# Patient Record
Sex: FEMALE | Employment: FULL TIME | URBAN - METROPOLITAN AREA
[De-identification: names, ages, dates, MRNs, and addresses within clinical notes are randomized per-mention and may not be internally consistent; named-entity substitution may affect disease eponyms.]

---

## 2017-04-01 ENCOUNTER — APPOINTMENT (OUTPATIENT)
Dept: GENERAL RADIOLOGY | Facility: HOSPITAL | Age: 62
End: 2017-04-01
Attending: EMERGENCY MEDICINE
Payer: COMMERCIAL

## 2017-04-01 ENCOUNTER — HOSPITAL ENCOUNTER (EMERGENCY)
Facility: HOSPITAL | Age: 62
Discharge: HOME | End: 2017-04-01
Attending: EMERGENCY MEDICINE
Payer: COMMERCIAL

## 2017-04-01 VITALS
BODY MASS INDEX: 40.97 KG/M2 | OXYGEN SATURATION: 97 % | TEMPERATURE: 98 F | DIASTOLIC BLOOD PRESSURE: 79 MMHG | WEIGHT: 240 LBS | RESPIRATION RATE: 16 BRPM | SYSTOLIC BLOOD PRESSURE: 175 MMHG | HEART RATE: 59 BPM | HEIGHT: 64 IN

## 2017-04-01 DIAGNOSIS — M17.11 PRIMARY OSTEOARTHRITIS OF RIGHT KNEE: Primary | ICD-10-CM

## 2017-04-01 PROCEDURE — 99284 EMERGENCY DEPT VISIT MOD MDM: CPT

## 2017-04-01 PROCEDURE — 72110 X-RAY EXAM L-2 SPINE 4/>VWS: CPT

## 2017-04-01 PROCEDURE — 73552 X-RAY EXAM OF FEMUR 2/>: CPT

## 2017-04-01 RX ORDER — HYDROCODONE BITARTRATE AND ACETAMINOPHEN 5; 325 MG/1; MG/1
1-2 TABLET ORAL EVERY 4 HOURS PRN
Qty: 20 TABLET | Refills: 0 | Status: SHIPPED | OUTPATIENT
Start: 2017-04-01 | End: 2017-04-08

## 2017-04-01 RX ORDER — HYDROCODONE BITARTRATE AND ACETAMINOPHEN 5; 325 MG/1; MG/1
1 TABLET ORAL ONCE
Status: COMPLETED | OUTPATIENT
Start: 2017-04-01 | End: 2017-04-01

## 2017-04-01 NOTE — ED PROVIDER NOTES
Patient Seen in: [facility] Emergency Department    History   Patient presents with:  Lower Extremity Injury (musculoskeletal)  Pain (neurologic)    Stated Complaint: groin/leg/hip pain    HPI    Patient presents with left lower extremity pain which [address]    UMBILICAL HERNIA REPAIR N/A 5/22/2015    Comment Procedure:  HERNIA UMBILICAL REPAIR ADULT;  Surgeon: Brooke Grewal MD;  Location:  MAIN OR       Medications :   HYDROcodone-acetaminophen 5-325 MG Oral Tab,  Take 1-2 tablets by m the knee and the hip seems to elicit discomfort. No crepitation, edema or effusion is noted. Distal CMS exam is intact. Mild discomfort is noted with palpation in the lumbosacral region as well.   Distal deep tendon reflexes, motor and sensory exam is un

## 2017-04-01 NOTE — ED INITIAL ASSESSMENT (HPI)
C/o pain to entire right leg intermittently for a couple week,  Worse today since am.  Able to ambulate  No injury

## 2017-04-05 PROBLEM — M54.42 ACUTE LEFT-SIDED LOW BACK PAIN WITH LEFT-SIDED SCIATICA: Status: ACTIVE | Noted: 2017-04-05

## 2017-04-05 PROBLEM — M17.0 PRIMARY OSTEOARTHRITIS OF BOTH KNEES: Status: ACTIVE | Noted: 2017-04-05

## 2017-05-10 PROBLEM — M54.42 ACUTE LEFT-SIDED LOW BACK PAIN WITH LEFT-SIDED SCIATICA: Status: ACTIVE | Noted: 2017-05-10

## 2017-05-22 ENCOUNTER — APPOINTMENT (OUTPATIENT)
Dept: LAB | Age: 62
End: 2017-05-22
Attending: INTERNAL MEDICINE
Payer: COMMERCIAL

## 2017-05-22 DIAGNOSIS — E04.2 MULTIPLE THYROID NODULES: ICD-10-CM

## 2017-05-23 PROCEDURE — 88173 CYTOPATH EVAL FNA REPORT: CPT

## 2017-05-31 NOTE — PROGRESS NOTES
Quick Note:    I would let her know that I reviewed her FNA report. One nodule came back nondiagnostic, the other was benign.  Given the overall stability of her nodules and the fact that she has had more than 1 previous benign FNA, I would recommend we con

## 2017-05-31 NOTE — PROGRESS NOTES
Quick Note:    Primary patient preferred number [phone] [facility] Ina Blank result note.  Hours and number given.     ______

## 2017-06-01 NOTE — PROGRESS NOTES
Quick Note:    Patient informed of Dr. Amrik Souza note. Patient verbalized understanding and agrees. Thyroid ultrasound ordered, and follow-up appt scheduled.      6/4/2018 8:00 AM Chicho Wilson MD Arbie Beer    ______

## 2017-07-28 PROCEDURE — 87186 SC STD MICRODIL/AGAR DIL: CPT | Performed by: OBSTETRICS & GYNECOLOGY

## 2017-07-28 PROCEDURE — 87070 CULTURE OTHR SPECIMN AEROBIC: CPT | Performed by: OBSTETRICS & GYNECOLOGY

## 2017-07-28 PROCEDURE — 88175 CYTOPATH C/V AUTO FLUID REDO: CPT | Performed by: OBSTETRICS & GYNECOLOGY

## 2017-07-28 PROCEDURE — 87077 CULTURE AEROBIC IDENTIFY: CPT | Performed by: OBSTETRICS & GYNECOLOGY

## 2017-07-28 PROCEDURE — 87205 SMEAR GRAM STAIN: CPT | Performed by: OBSTETRICS & GYNECOLOGY

## 2018-05-31 PROBLEM — H25.13 NUCLEAR SCLEROTIC CATARACT OF BOTH EYES: Status: ACTIVE | Noted: 2018-05-31

## 2018-05-31 PROBLEM — E11.9 DIABETES MELLITUS TYPE 2 WITHOUT RETINOPATHY (HCC): Status: ACTIVE | Noted: 2018-05-31

## 2018-08-07 PROCEDURE — 88175 CYTOPATH C/V AUTO FLUID REDO: CPT | Performed by: OBSTETRICS & GYNECOLOGY

## 2018-10-18 PROCEDURE — 88305 TISSUE EXAM BY PATHOLOGIST: CPT | Performed by: INTERNAL MEDICINE

## 2019-01-08 PROBLEM — M54.41 ACUTE RIGHT-SIDED LOW BACK PAIN WITH RIGHT-SIDED SCIATICA: Status: ACTIVE | Noted: 2019-01-08

## 2019-08-16 PROCEDURE — 81001 URINALYSIS AUTO W/SCOPE: CPT | Performed by: INTERNAL MEDICINE

## 2019-08-23 PROCEDURE — 88175 CYTOPATH C/V AUTO FLUID REDO: CPT | Performed by: OBSTETRICS & GYNECOLOGY

## 2019-08-27 PROCEDURE — 81015 MICROSCOPIC EXAM OF URINE: CPT | Performed by: OBSTETRICS & GYNECOLOGY

## 2020-08-17 PROBLEM — M54.41 ACUTE RIGHT-SIDED LOW BACK PAIN WITH RIGHT-SIDED SCIATICA: Status: RESOLVED | Noted: 2019-01-08 | Resolved: 2020-08-17

## 2020-08-17 PROBLEM — E66.9 OBESITY: Status: ACTIVE | Noted: 2020-08-17

## 2021-03-29 ENCOUNTER — ANESTHESIA EVENT (OUTPATIENT)
Dept: SURGERY | Facility: HOSPITAL | Age: 66
DRG: 908 | End: 2021-03-29
Payer: COMMERCIAL

## 2021-03-29 ENCOUNTER — ANESTHESIA (OUTPATIENT)
Dept: SURGERY | Facility: HOSPITAL | Age: 66
DRG: 908 | End: 2021-03-29
Payer: COMMERCIAL

## 2021-03-29 ENCOUNTER — HOSPITAL ENCOUNTER (INPATIENT)
Facility: HOSPITAL | Age: 66
LOS: 3 days | Discharge: HOME | DRG: 908 | End: 2021-04-01
Attending: SURGERY | Admitting: SURGERY
Payer: COMMERCIAL

## 2021-03-29 DIAGNOSIS — S31.109A CHRONIC ABDOMINAL WOUND INFECTION: Primary | ICD-10-CM

## 2021-03-29 DIAGNOSIS — L08.9 CHRONIC WOUND INFECTION OF ABDOMEN, SUBSEQUENT ENCOUNTER: ICD-10-CM

## 2021-03-29 DIAGNOSIS — S31.109D CHRONIC WOUND INFECTION OF ABDOMEN, SUBSEQUENT ENCOUNTER: ICD-10-CM

## 2021-03-29 DIAGNOSIS — L08.9 CHRONIC ABDOMINAL WOUND INFECTION: Primary | ICD-10-CM

## 2021-03-29 PROCEDURE — 88307 TISSUE EXAM BY PATHOLOGIST: CPT | Performed by: SURGERY

## 2021-03-29 PROCEDURE — 82962 GLUCOSE BLOOD TEST: CPT

## 2021-03-29 PROCEDURE — 0WPF4JZ REMOVAL OF SYNTHETIC SUBSTITUTE FROM ABDOMINAL WALL, PERCUTANEOUS ENDOSCOPIC APPROACH: ICD-10-PCS | Performed by: SURGERY

## 2021-03-29 PROCEDURE — 88304 TISSUE EXAM BY PATHOLOGIST: CPT | Performed by: SURGERY

## 2021-03-29 PROCEDURE — 0DB84ZZ EXCISION OF SMALL INTESTINE, PERCUTANEOUS ENDOSCOPIC APPROACH: ICD-10-PCS | Performed by: SURGERY

## 2021-03-29 RX ORDER — DEXAMETHASONE SODIUM PHOSPHATE 4 MG/ML
VIAL (ML) INJECTION AS NEEDED
Status: DISCONTINUED | OUTPATIENT
Start: 2021-03-29 | End: 2021-03-29 | Stop reason: SURG

## 2021-03-29 RX ORDER — SODIUM CHLORIDE, SODIUM LACTATE, POTASSIUM CHLORIDE, CALCIUM CHLORIDE 600; 310; 30; 20 MG/100ML; MG/100ML; MG/100ML; MG/100ML
INJECTION, SOLUTION INTRAVENOUS CONTINUOUS
Status: DISCONTINUED | OUTPATIENT
Start: 2021-03-29 | End: 2021-04-01

## 2021-03-29 RX ORDER — HYDROMORPHONE HYDROCHLORIDE 1 MG/ML
0.8 INJECTION, SOLUTION INTRAMUSCULAR; INTRAVENOUS; SUBCUTANEOUS EVERY 2 HOUR PRN
Status: DISCONTINUED | OUTPATIENT
Start: 2021-03-29 | End: 2021-04-01

## 2021-03-29 RX ORDER — HYDROMORPHONE HYDROCHLORIDE 1 MG/ML
INJECTION, SOLUTION INTRAMUSCULAR; INTRAVENOUS; SUBCUTANEOUS
Status: COMPLETED
Start: 2021-03-29 | End: 2021-03-29

## 2021-03-29 RX ORDER — HEPARIN SODIUM 5000 [USP'U]/ML
5000 INJECTION, SOLUTION INTRAVENOUS; SUBCUTANEOUS ONCE
Status: COMPLETED | OUTPATIENT
Start: 2021-03-29 | End: 2021-03-29

## 2021-03-29 RX ORDER — NALOXONE HYDROCHLORIDE 0.4 MG/ML
80 INJECTION, SOLUTION INTRAMUSCULAR; INTRAVENOUS; SUBCUTANEOUS AS NEEDED
Status: DISCONTINUED | OUTPATIENT
Start: 2021-03-29 | End: 2021-03-29 | Stop reason: HOSPADM

## 2021-03-29 RX ORDER — SODIUM CHLORIDE AND POTASSIUM CHLORIDE .9; .15 G/100ML; G/100ML
SOLUTION INTRAVENOUS CONTINUOUS
Status: DISCONTINUED | OUTPATIENT
Start: 2021-03-29 | End: 2021-04-01

## 2021-03-29 RX ORDER — ONDANSETRON 2 MG/ML
INJECTION INTRAMUSCULAR; INTRAVENOUS AS NEEDED
Status: DISCONTINUED | OUTPATIENT
Start: 2021-03-29 | End: 2021-03-29 | Stop reason: SURG

## 2021-03-29 RX ORDER — ACETAMINOPHEN 500 MG
500 TABLET ORAL EVERY 6 HOURS PRN
COMMUNITY

## 2021-03-29 RX ORDER — HYDROCODONE BITARTRATE AND ACETAMINOPHEN 5; 325 MG/1; MG/1
1 TABLET ORAL AS NEEDED
Status: DISCONTINUED | OUTPATIENT
Start: 2021-03-29 | End: 2021-03-29 | Stop reason: HOSPADM

## 2021-03-29 RX ORDER — SODIUM CHLORIDE, SODIUM LACTATE, POTASSIUM CHLORIDE, CALCIUM CHLORIDE 600; 310; 30; 20 MG/100ML; MG/100ML; MG/100ML; MG/100ML
INJECTION, SOLUTION INTRAVENOUS CONTINUOUS
Status: DISCONTINUED | OUTPATIENT
Start: 2021-03-29 | End: 2021-03-29 | Stop reason: HOSPADM

## 2021-03-29 RX ORDER — ONDANSETRON 2 MG/ML
4 INJECTION INTRAMUSCULAR; INTRAVENOUS AS NEEDED
Status: DISCONTINUED | OUTPATIENT
Start: 2021-03-29 | End: 2021-03-29 | Stop reason: HOSPADM

## 2021-03-29 RX ORDER — ROCURONIUM BROMIDE 10 MG/ML
INJECTION, SOLUTION INTRAVENOUS AS NEEDED
Status: DISCONTINUED | OUTPATIENT
Start: 2021-03-29 | End: 2021-03-29 | Stop reason: SURG

## 2021-03-29 RX ORDER — KETOROLAC TROMETHAMINE 15 MG/ML
15 INJECTION, SOLUTION INTRAMUSCULAR; INTRAVENOUS EVERY 6 HOURS PRN
Status: ACTIVE | OUTPATIENT
Start: 2021-03-29 | End: 2021-03-31

## 2021-03-29 RX ORDER — HYDROMORPHONE HYDROCHLORIDE 1 MG/ML
0.4 INJECTION, SOLUTION INTRAMUSCULAR; INTRAVENOUS; SUBCUTANEOUS EVERY 2 HOUR PRN
Status: DISCONTINUED | OUTPATIENT
Start: 2021-03-29 | End: 2021-04-01

## 2021-03-29 RX ORDER — BUPIVACAINE HYDROCHLORIDE AND EPINEPHRINE 5; 5 MG/ML; UG/ML
INJECTION, SOLUTION EPIDURAL; INTRACAUDAL; PERINEURAL AS NEEDED
Status: DISCONTINUED | OUTPATIENT
Start: 2021-03-29 | End: 2021-03-29 | Stop reason: HOSPADM

## 2021-03-29 RX ORDER — PHENYLEPHRINE HCL 10 MG/ML
VIAL (ML) INJECTION AS NEEDED
Status: DISCONTINUED | OUTPATIENT
Start: 2021-03-29 | End: 2021-03-29 | Stop reason: SURG

## 2021-03-29 RX ORDER — NEOSTIGMINE METHYLSULFATE 1 MG/ML
INJECTION INTRAVENOUS AS NEEDED
Status: DISCONTINUED | OUTPATIENT
Start: 2021-03-29 | End: 2021-03-29 | Stop reason: SURG

## 2021-03-29 RX ORDER — HEPARIN SODIUM 5000 [USP'U]/ML
5000 INJECTION, SOLUTION INTRAVENOUS; SUBCUTANEOUS EVERY 8 HOURS SCHEDULED
Status: DISCONTINUED | OUTPATIENT
Start: 2021-03-29 | End: 2021-04-01

## 2021-03-29 RX ORDER — HYDROCODONE BITARTRATE AND ACETAMINOPHEN 5; 325 MG/1; MG/1
2 TABLET ORAL EVERY 4 HOURS PRN
Status: DISCONTINUED | OUTPATIENT
Start: 2021-03-29 | End: 2021-04-01

## 2021-03-29 RX ORDER — HYDROCODONE BITARTRATE AND ACETAMINOPHEN 5; 325 MG/1; MG/1
1 TABLET ORAL EVERY 4 HOURS PRN
Status: DISCONTINUED | OUTPATIENT
Start: 2021-03-29 | End: 2021-04-01

## 2021-03-29 RX ORDER — LIDOCAINE HYDROCHLORIDE 10 MG/ML
INJECTION, SOLUTION EPIDURAL; INFILTRATION; INTRACAUDAL; PERINEURAL AS NEEDED
Status: DISCONTINUED | OUTPATIENT
Start: 2021-03-29 | End: 2021-03-29 | Stop reason: SURG

## 2021-03-29 RX ORDER — KETOROLAC TROMETHAMINE 30 MG/ML
30 INJECTION, SOLUTION INTRAMUSCULAR; INTRAVENOUS EVERY 6 HOURS PRN
Status: DISPENSED | OUTPATIENT
Start: 2021-03-29 | End: 2021-03-31

## 2021-03-29 RX ORDER — ACETAMINOPHEN 500 MG
1000 TABLET ORAL ONCE
Status: DISCONTINUED | OUTPATIENT
Start: 2021-03-29 | End: 2021-03-29

## 2021-03-29 RX ORDER — HYDROMORPHONE HYDROCHLORIDE 1 MG/ML
0.4 INJECTION, SOLUTION INTRAMUSCULAR; INTRAVENOUS; SUBCUTANEOUS EVERY 5 MIN PRN
Status: DISCONTINUED | OUTPATIENT
Start: 2021-03-29 | End: 2021-03-29 | Stop reason: HOSPADM

## 2021-03-29 RX ORDER — DEXTROSE MONOHYDRATE 25 G/50ML
50 INJECTION, SOLUTION INTRAVENOUS
Status: DISCONTINUED | OUTPATIENT
Start: 2021-03-29 | End: 2021-04-01

## 2021-03-29 RX ORDER — GLYCOPYRROLATE 0.2 MG/ML
INJECTION, SOLUTION INTRAMUSCULAR; INTRAVENOUS AS NEEDED
Status: DISCONTINUED | OUTPATIENT
Start: 2021-03-29 | End: 2021-03-29 | Stop reason: SURG

## 2021-03-29 RX ORDER — HYDROCODONE BITARTRATE AND ACETAMINOPHEN 5; 325 MG/1; MG/1
2 TABLET ORAL AS NEEDED
Status: DISCONTINUED | OUTPATIENT
Start: 2021-03-29 | End: 2021-03-29 | Stop reason: HOSPADM

## 2021-03-29 RX ORDER — DEXTROSE MONOHYDRATE 25 G/50ML
50 INJECTION, SOLUTION INTRAVENOUS
Status: DISCONTINUED | OUTPATIENT
Start: 2021-03-29 | End: 2021-03-29 | Stop reason: HOSPADM

## 2021-03-29 RX ORDER — LEVOFLOXACIN 5 MG/ML
750 INJECTION, SOLUTION INTRAVENOUS EVERY 24 HOURS
Status: DISCONTINUED | OUTPATIENT
Start: 2021-03-29 | End: 2021-04-01

## 2021-03-29 RX ORDER — HYDROMORPHONE HYDROCHLORIDE 1 MG/ML
1.2 INJECTION, SOLUTION INTRAMUSCULAR; INTRAVENOUS; SUBCUTANEOUS EVERY 2 HOUR PRN
Status: DISCONTINUED | OUTPATIENT
Start: 2021-03-29 | End: 2021-04-01

## 2021-03-29 RX ORDER — EPHEDRINE SULFATE 50 MG/ML
INJECTION INTRAVENOUS AS NEEDED
Status: DISCONTINUED | OUTPATIENT
Start: 2021-03-29 | End: 2021-03-29 | Stop reason: SURG

## 2021-03-29 RX ORDER — CLINDAMYCIN PHOSPHATE 900 MG/50ML
900 INJECTION INTRAVENOUS ONCE
Status: COMPLETED | OUTPATIENT
Start: 2021-03-29 | End: 2021-03-29

## 2021-03-29 RX ADMIN — EPHEDRINE SULFATE 10 MG: 50 INJECTION INTRAVENOUS at 14:44:00

## 2021-03-29 RX ADMIN — PHENYLEPHRINE HCL 100 MCG: 10 MG/ML VIAL (ML) INJECTION at 13:13:00

## 2021-03-29 RX ADMIN — LIDOCAINE HYDROCHLORIDE 50 MG: 10 INJECTION, SOLUTION EPIDURAL; INFILTRATION; INTRACAUDAL; PERINEURAL at 12:52:00

## 2021-03-29 RX ADMIN — SODIUM CHLORIDE, SODIUM LACTATE, POTASSIUM CHLORIDE, CALCIUM CHLORIDE: 600; 310; 30; 20 INJECTION, SOLUTION INTRAVENOUS at 15:12:00

## 2021-03-29 RX ADMIN — GLYCOPYRROLATE 1 MG: 0.2 INJECTION, SOLUTION INTRAMUSCULAR; INTRAVENOUS at 14:58:00

## 2021-03-29 RX ADMIN — ROCURONIUM BROMIDE 10 MG: 10 INJECTION, SOLUTION INTRAVENOUS at 14:30:00

## 2021-03-29 RX ADMIN — ROCURONIUM BROMIDE 20 MG: 10 INJECTION, SOLUTION INTRAVENOUS at 13:42:00

## 2021-03-29 RX ADMIN — DEXAMETHASONE SODIUM PHOSPHATE 4 MG: 4 MG/ML VIAL (ML) INJECTION at 13:09:00

## 2021-03-29 RX ADMIN — EPHEDRINE SULFATE 10 MG: 50 INJECTION INTRAVENOUS at 13:30:00

## 2021-03-29 RX ADMIN — ROCURONIUM BROMIDE 50 MG: 10 INJECTION, SOLUTION INTRAVENOUS at 12:52:00

## 2021-03-29 RX ADMIN — EPHEDRINE SULFATE 10 MG: 50 INJECTION INTRAVENOUS at 14:49:00

## 2021-03-29 RX ADMIN — SODIUM CHLORIDE, SODIUM LACTATE, POTASSIUM CHLORIDE, CALCIUM CHLORIDE: 600; 310; 30; 20 INJECTION, SOLUTION INTRAVENOUS at 13:53:00

## 2021-03-29 RX ADMIN — ONDANSETRON 4 MG: 2 INJECTION INTRAMUSCULAR; INTRAVENOUS at 14:58:00

## 2021-03-29 RX ADMIN — EPHEDRINE SULFATE 10 MG: 50 INJECTION INTRAVENOUS at 14:13:00

## 2021-03-29 RX ADMIN — NEOSTIGMINE METHYLSULFATE 5 MG: 1 INJECTION INTRAVENOUS at 14:58:00

## 2021-03-29 RX ADMIN — CLINDAMYCIN PHOSPHATE 900 MG: 900 INJECTION INTRAVENOUS at 13:05:00

## 2021-03-29 RX ADMIN — EPHEDRINE SULFATE 10 MG: 50 INJECTION INTRAVENOUS at 13:44:00

## 2021-03-29 RX ADMIN — PHENYLEPHRINE HCL 100 MCG: 10 MG/ML VIAL (ML) INJECTION at 13:19:00

## 2021-03-29 NOTE — ANESTHESIA POSTPROCEDURE EVALUATION
[address] [facility] Patient Status:  Hospital Outpatient Surgery   Age/Gender 63year old female MRN [id number]   [facility] SURGERY Attending Taran Phillips MD   Hosp Day # 0 PCP Marcelino Harrell MD       Anesthesia Post-op

## 2021-03-29 NOTE — H&P
Maren Rouse is a 63year old female  No chief complaint on file. 64 yo F with drainage from umbilicus and CT scan with inflammation under umbilicus.           Past Medical History:   Diagnosis Date   • ALLERGIC RHINITIS 1/6/2011   • Anxiety 8/11/200 for Pain., Disp: , Rfl:   Nebivolol HCl (BYSTOLIC) 10 MG Oral Tab, Take 1 tablet (10 mg total) by mouth daily. , Disp: 90 tablet, Rfl: 1  amLODIPine Besylate 10 MG Oral Tab, Take 1 tablet (10 mg total) by mouth daily. , Disp: 90 tablet, Rfl: 1  Irbesartan 15 constipation, diarrhea; no rectal bleeding; no heartburn  GENITAL/: no dysuria, urgency or frequency, no tea colored urine  MUSCULOSKELETAL: no joint complaints upper or lower extremities  HEMATOLOGY: denies hx anemia; denies bruising or excessive bleedi

## 2021-03-29 NOTE — ANESTHESIA PREPROCEDURE EVALUATION
PRE-OP EVALUATION    Patient Name: Talya Almonte    Admit Diagnosis: Chronic wound infection of abdomen, subsequent encounter [S31.109D, L08.9]    Pre-op Diagnosis: Chronic wound infection of abdomen, subsequent encounter [S31.109D, L08.9]    EXPLORATOR 1  aspirin 81 MG Oral Tab, Take 81 mg by mouth daily. , Disp: , Rfl: , Past Month at Unknown time  Fluticasone Propionate 50 MCG/ACT Nasal Suspension, 2 sprays by Nasal route daily. , Disp: 3 Bottle, Rfl: 3        Allergies: Lisinopril and Penicillins      A 5/22/2015    Performed by Brooke Grewal MD at [facility] MAIN OR   • SPECIAL SERVICE OR REPORT      S/P R KNEE ARTHROSCOPY   • UPPER GI ENDOSCOPY - REFERRAL  5/19/15 - [facility]    Normal EGD - dilated with 47 Fr Savary     Social History    Tobacco Use      Smokin

## 2021-03-29 NOTE — PLAN OF CARE
Per dr. Celena Crow , on call for dr. Lucas Urbano today, ok to start Heparin subcutaneous for DVT prophylaxis tonight.

## 2021-03-29 NOTE — ANESTHESIA PROCEDURE NOTES
Airway  Date/Time: 3/29/2021 12:55 PM  Urgency: elective      General Information and Staff    Patient location during procedure: OR  Anesthesiologist: Myles King MD  Resident/CRNA: Kerrie Santos CRNA  Performed: CRNA     Indications and Patient Conditi

## 2021-03-29 NOTE — BRIEF OP NOTE
Pre-Operative Diagnosis: Chronic wound infection of abdomen, subsequent encounter [S31.109D, L08.9]     Post-Operative Diagnosis: Chronic wound infection of abdomen, subsequent encounter [S31.109D, L08.9]      Procedure Performed:   EXPLORATORY LAPAROSCOPY

## 2021-03-29 NOTE — PROGRESS NOTES
[facility] Pharmacy Note:  Dose Adjustment for levofloxacin (LEVAQUIN)    Talya Almonte is a 63year old patient who has been prescribed levofloxacin (LEVAQUIN) 500 mg IVPB every 24 hrs. Estimated CrCl 80.6 ml/min based on 3/12/2021 SCr 0.63 mg/dL.  The do

## 2021-03-29 NOTE — CONSULTS
General Medicine Consult      Consulted by: Cosme Burgos MD    PCP: Rossy Reddy MD    Reason for Consultation: Medical Management    History of Present Illness: Patient is a 63year old female with PMH including but not limited to GERD, HTN, HL, MD W[facility] at [facility] MAIN OR   • SPECIAL SERVICE OR REPORT      S/P R KNEE ARTHROSCOPY   • UPPER GI ENDOSCOPY - REFERRAL  5/19/15 - [facility] Crowell    Normal EGD - dilated with 54 Fr Savary        HOME MEDS       ALL:    Lisinopril                  Comment:Throat tightness obtained without infusion of IV contrast  or administration of the oral contrast medium from the lung bases to the level of ischial tuberosities (below the level of the pubic symphysis).  Subsequently, coronal and sagittal  images were reconstructed on a se excluded. It should be noted that lack of IV contrast compromises further assessment and therefore definite possibility of a small loculated collection (i.e. abscess) cannot be excluded. Sonographic correlation may be needed, if clinically indicated.  BOWEL constipation     # Hypoxia  -On 3L  -IS 10x/hr for atelectasis    # Hypertension  - controlled with amlodipine, Irbesartan and bystolic when on PO     # HL  - low chol diet and exercise     # obesity d/t excess calories  - cont to watch calorie intake and

## 2021-03-29 NOTE — OPERATIVE REPORT
Pre-Operative Diagnosis: Chronic wound infection of abdomen, subsequent encounter [S31.109D, L08.9]   Post-Operative Diagnosis: Chronic wound infection of abdomen, subsequent encounter [S31.109D, L08.9]   Procedure Performed:   EXPLORATORY LAPAROSCOPY , RE into the mesh. We  the small bowel from the mesh but there was an enterotomy. We then excised the mesh with sharp dissection and cautery.   The epigastric incison was made larger and tarsha wound protector was placed and small bowel resection was

## 2021-03-30 PROCEDURE — 82962 GLUCOSE BLOOD TEST: CPT

## 2021-03-30 PROCEDURE — 83735 ASSAY OF MAGNESIUM: CPT | Performed by: INTERNAL MEDICINE

## 2021-03-30 PROCEDURE — 80048 BASIC METABOLIC PNL TOTAL CA: CPT | Performed by: SURGERY

## 2021-03-30 PROCEDURE — 83036 HEMOGLOBIN GLYCOSYLATED A1C: CPT | Performed by: INTERNAL MEDICINE

## 2021-03-30 PROCEDURE — 85027 COMPLETE CBC AUTOMATED: CPT | Performed by: SURGERY

## 2021-03-30 RX ORDER — LABETALOL HYDROCHLORIDE 5 MG/ML
10 INJECTION, SOLUTION INTRAVENOUS EVERY 6 HOURS PRN
Status: DISCONTINUED | OUTPATIENT
Start: 2021-03-30 | End: 2021-03-31

## 2021-03-30 NOTE — PROGRESS NOTES
[address] [facility] Patient Status:  Observation    1955 MRN [id number]   [facility] 3NW-A Attending Shadia Jensen MD   Hosp Day # 0 PCP Gabriel Everett MD     Subjective: Interval History: has complaints of gas.

## 2021-03-30 NOTE — PLAN OF CARE
Problem: Patient/Family Goals  Goal: Patient/Family Long Term Goal  Description: Patient's Long Term Goal:   GO HOME    Interventions:  - IVF, PAIN MEDS PRN, AMBULATE, USE IS.  - See additional Care Plan goals for specific interventions  Outcome: Progres

## 2021-03-30 NOTE — PROGRESS NOTES
[facility]  Progress Note    Frankey  Patient Status:  Observation    1955 MRN [id number]   [facility] 3NW-A Attending Isaura Beckett MD   Hosp Day # 0 PCP Andrew Durand MD     Subjective:    Patient reports pain con stay on clear liquids today, will advance as bowel function returns  Continue IV antibiotic will transition to PO upon dc home  Encourage ambulation/IS  All questions answered  DW PERLA Chatterjee [facility]  General Surgery  3/3

## 2021-03-30 NOTE — PROGRESS NOTES
[facility] Hospitalist Progress Note     PCP: Isauro Newman MD    Chief Complaint: follow-up    Overnight/Interim Events:      SUBJECTIVE:  Having gas. No n/v/f/c. Pain ok. Feels cold. On cleras. Walked. 153/65. OBJECTIVE:  Temp:  [97.3 °F (36.3 °C)-98. ketorolac (TORADOL) injection **OR** ketorolac (TORADOL) injection, HYDROcodone-acetaminophen **OR** HYDROcodone-acetaminophen, HYDROmorphone HCl **OR** HYDROmorphone HCl **OR** HYDROmorphone HCl, glucose **OR** Glucose-Vitamin C **OR** dextrose **OR**

## 2021-03-30 NOTE — PLAN OF CARE
Pt is alert and oriented x4. Lungs are clear bilaterally on room air. IS up to 1500. Bowel sounds are hypoactive. NPO, will advance to clears in am. Denies nausea. No gas or Bm's. 2 small incisions to abdomen with gauze and tape.  Both with small amount of infection  Description: INTERVENTIONS:  - Assess and document risk factors for pressure ulcer development  - Assess and document skin integrity  - Assess and document dressing/incision, wound bed, drain sites and surrounding tissue  - Implement wound care

## 2021-03-30 NOTE — PAYOR COMM NOTE
--------------  ADMISSION REVIEW     Payor: [facility] PPO  Subscriber #:  [id number]  Authorization Number: [id number]         H&P - H&P Note        Bob Dixon is a 63year old female  No chief complaint on file.     62 yo F with jason Alyx DEVREIS:  GENERAL HEALTH: otherwise feels well, no weight loss, no fever or chills  SKIN: denies any unusual skin rashes or jaundice  HEENT: denies nasal congestion, sinus pain or sore throat; hearing loss negative  RESPIRATORY: denies shortness o as tolerated   -PO food/fluid per surgery, bowel regimen as needed; NPO and plan clears in AM if not nauseous  -dave/post-op abx per surgery, IV levaquin     # pain, expected  -IV pain meds as needed, will monitor and encourage transition to PO pain meds a 03/29/21 1051 98 °F (36.7 °C) 65 20 193/85Abnormal  100 % 240 lb 15.4 oz

## 2021-03-31 PROCEDURE — 80048 BASIC METABOLIC PNL TOTAL CA: CPT | Performed by: SURGERY

## 2021-03-31 PROCEDURE — 85027 COMPLETE CBC AUTOMATED: CPT | Performed by: SURGERY

## 2021-03-31 PROCEDURE — 82962 GLUCOSE BLOOD TEST: CPT

## 2021-03-31 RX ORDER — ATORVASTATIN CALCIUM 20 MG/1
20 TABLET, FILM COATED ORAL
Status: DISCONTINUED | OUTPATIENT
Start: 2021-03-31 | End: 2021-04-01

## 2021-03-31 RX ORDER — CALCIUM CARBONATE 200(500)MG
500 TABLET,CHEWABLE ORAL EVERY 6 HOURS PRN
Status: DISCONTINUED | OUTPATIENT
Start: 2021-03-31 | End: 2021-04-01

## 2021-03-31 RX ORDER — FLUTICASONE PROPIONATE 50 MCG
2 SPRAY, SUSPENSION (ML) NASAL DAILY
Status: DISCONTINUED | OUTPATIENT
Start: 2021-03-31 | End: 2021-04-01

## 2021-03-31 RX ORDER — LOSARTAN POTASSIUM 50 MG/1
50 TABLET ORAL DAILY
Refills: 1 | Status: DISCONTINUED | OUTPATIENT
Start: 2021-03-31 | End: 2021-04-01

## 2021-03-31 RX ORDER — NEBIVOLOL 10 MG/1
10 TABLET ORAL DAILY
Status: DISCONTINUED | OUTPATIENT
Start: 2021-03-31 | End: 2021-04-01

## 2021-03-31 RX ORDER — CALCIUM CARBONATE 200(500)MG
TABLET,CHEWABLE ORAL EVERY 6 HOURS PRN
Status: DISCONTINUED | OUTPATIENT
Start: 2021-03-31 | End: 2021-03-31 | Stop reason: SDUPTHER

## 2021-03-31 RX ORDER — AMLODIPINE BESYLATE 5 MG/1
10 TABLET ORAL DAILY
Status: DISCONTINUED | OUTPATIENT
Start: 2021-03-31 | End: 2021-04-01

## 2021-03-31 RX ORDER — CALCIUM CARBONATE 200(500)MG
1000 TABLET,CHEWABLE ORAL EVERY 6 HOURS PRN
Status: DISCONTINUED | OUTPATIENT
Start: 2021-03-31 | End: 2021-04-01

## 2021-03-31 NOTE — PROGRESS NOTES
[facility] Hospitalist Progress Note     PCP: Walt Go MD    Chief Complaint: follow-up    Overnight/Interim Events:      SUBJECTIVE:  Tolerating diet so far. No gas or BM. Pain controlled.  No cp or sob    OBJECTIVE:  Temp:  [98.5 °F (36.9 °C)-99.2 °F levofloxacin  750 mg Intravenous Q24H   • Heparin Sodium (Porcine)  5,000 Units Subcutaneous Q8H [facility]   • Insulin Aspart Pen  1-5 Units Subcutaneous TID CC and HS     • lactated ringers Stopped (03/29/21 1700)   • Potassium Chloride in NaCl 50 mL/hr at 03/3 lopez Flores [facility]  [phone]

## 2021-03-31 NOTE — PLAN OF CARE
Alert and oriented  Pain well controlled at this time  Tolerating IV fluids  Dressing to abdominal incision is c/d/i  VSS, will continue to monitor.     Problem: GASTROINTESTINAL - ADULT  Goal: Minimal or absence of nausea and vomiting  Description: INTERVE Pressure Ulcer prevention bundle as indicated  Outcome: Progressing

## 2021-03-31 NOTE — PROGRESS NOTES
[facility]    Aiden Roles Patient Status:  Inpatient    1955 MRN [id number]   [facility] 3NW-A Attending Brina Mendoza MD   Hosp Day # 2 PCP Madhuri Dumont MD     Subjective: Interval History:   No acute events.   +flat

## 2021-03-31 NOTE — PLAN OF CARE
Pt A&Ox4. RA. Pt denies any shortness of breath, chest pain or calf pain. Midline incision and transverse at umbilicus w/ staples c/d/I, dressing changed. 2 lap sites with SS c/d/I. Pt denies N/V, burping and passing gas.  Safety precautions in place, call sites and surrounding tissue  - Implement wound care per orders  - Initiate isolation precautions as appropriate  - Initiate Pressure Ulcer prevention bundle as indicated  Outcome: Progressing

## 2021-03-31 NOTE — PLAN OF CARE
Pt A&Ox4. RA. Pt denies shortness of breath, chest pain and calf pain. Pt tolerating soft diet well, denies any N/V. Pt burping but not passing gas. Midline and ubilical transverse incision c/d/I, dressing changed.  Lap sites x2 with SS and old drainage not protocol/standard of care  - Consider collaborating with pharmacy to review patient's medication profile  - Implement strategies to promote bladder emptying  Outcome: Progressing     Problem: SKIN/TISSUE INTEGRITY - ADULT  Goal: Incision(s), wounds(s) or d

## 2021-04-01 VITALS
HEIGHT: 64.5 IN | SYSTOLIC BLOOD PRESSURE: 135 MMHG | HEART RATE: 75 BPM | RESPIRATION RATE: 16 BRPM | BODY MASS INDEX: 40.63 KG/M2 | DIASTOLIC BLOOD PRESSURE: 81 MMHG | WEIGHT: 240.94 LBS | OXYGEN SATURATION: 97 % | TEMPERATURE: 99 F

## 2021-04-01 PROCEDURE — 82962 GLUCOSE BLOOD TEST: CPT

## 2021-04-01 RX ORDER — LEVOFLOXACIN 750 MG/1
750 TABLET ORAL DAILY
Qty: 7 TABLET | Refills: 0 | Status: SHIPPED | OUTPATIENT
Start: 2021-04-01 | End: 2021-04-08

## 2021-04-01 RX ORDER — METRONIDAZOLE 500 MG/1
500 TABLET ORAL 3 TIMES DAILY
Qty: 21 TABLET | Refills: 0 | Status: SHIPPED | OUTPATIENT
Start: 2021-04-01 | End: 2021-04-08

## 2021-04-01 NOTE — PROGRESS NOTES
[facility] Hospitalist Progress Note     PCP: Desmond Vernon MD    Chief Complaint: follow-up    Overnight/Interim Events:      SUBJECTIVE:   Had BM, mostly diarrhea. Passing gas. No n/v, tolerating diet.  Feels ready to go home    OBJECTIVE:  Temp:  [98.1 ° Oral Daily   • levofloxacin  750 mg Intravenous Q24H   • Heparin Sodium (Porcine)  5,000 Units Subcutaneous Q8H [address]   • Insulin Aspart Pen  1-5 Units Subcutaneous TID CC and HS     • lactated ringers Stopped (03/29/21 1700)   • Potassium Chloride in NaCl 5

## 2021-04-01 NOTE — PLAN OF CARE
Pt a/ox4, room air, nsr on telemetry, denies pain and n/v tolerating diet. Accu QID. Midline incision KESHIA. Lap x2 old drainage. Voiding freely. Abd soft and denies passing flatus. IVF infusing. POC discussed. Call light within reach.  Will continue to monit to review patient's medication profile  - Implement strategies to promote bladder emptying  Outcome: Progressing     Problem: SKIN/TISSUE INTEGRITY - ADULT  Goal: Incision(s), wounds(s) or drain site(s) healing without S/S of infection  Description: INTERV

## 2021-04-01 NOTE — PROGRESS NOTES
[facility]  Progress Note    Deangelo Mcbride Patient Status:  Inpatient    1955 MRN [id number]   [facility] 3NW-A Attending Tyson Carballo MD   Hosp Day # 3 PCP Ruslan Boucher MD     Subjective:    Patient tolerating diet, p

## 2021-04-01 NOTE — PLAN OF CARE
Patient assessed and ready for DC home  All instructions given to patient; including diet for home. Patient voided understanding of DC plan  All questions answered to patients level of satisfaction  PIv removed and intact. .        NURSING DISCHARGE NOTE intake/output and perform bladder scan as needed  - Follow urinary retention protocol/standard of care  - Consider collaborating with pharmacy to review patient's medication profile  - Implement strategies to promote bladder emptying  Outcome: Adequate for

## 2021-04-02 NOTE — PAYOR COMM NOTE
--------------  DISCHARGE REVIEW    Payor: [employer] PPO  Subscriber #:  [id number]  Authorization Number: [id number]    Admit date: 3/29/21  Admit time:   3:13 PM  Discharge Date: 4/1/2021  4:30 PM     Admitting Physician: Iva Marie

## 2021-04-21 NOTE — DISCHARGE SUMMARY
Surgery Brief Discharge Summary         Patient ID:  Paulie Oakes  [id number]  63year old  9/13/1955    Date of admit 3/29/21    Date of Discharge 4/1/21    Discharge Diagnoses: Chronic wound infection of abdomen, subsequent encounter [S31.109D, L08.

## 2022-07-25 NOTE — RESPIRATORY THERAPY NOTE
CALLED 3 TIMES EXPLAINING THAT PT WILL HAVE TO BE RESEEN BY THEIR SLEEP DOCTOR AND GET A NEW SLEEP STUDY DONE DUE TO THE FACT THAT THE OLD SLEEP STUDY WAS SCORED 3% AS OPPOSED TO 4% WHICH IS THE SCORING NEEDED FOR MEDICARE TO COVER A NEW CPAP MACHINE. CLOSING NOTE.   Pt denies the usage of CPAP at home.

## 2022-12-12 NOTE — LETTER
[facility] Department  Phone: [phone]  OUTSIDE TESTING RESULT REQUEST      TO: Dr. Alfreda Henry              Today's Date: 3/11/21    FAX #: [phone]     IMPORTANT: FOR YOUR IMMEDIATE ATTENTION  Please FAX all test results listed katlyn Last office visit 9/19/2022, advised to follow-up 4 months, next appointment 1/19/2023.

## 2025-06-29 NOTE — H&P
Gastroenterology H and P  By Dr. Dandre Ley  CC: personal history of colon polyps     HPI:  Vidhi Wiggins is a 69 year old female. Consult requested by Dr. Lerma ref. provider found for a personal history of colon polyps.  Patient's last colonoscopy 2018, notable for three adenomatous polyps.  Adenomatous polyps were initially diagnosed 2009.        Allergy: Allergies[1]  Current Medications[2]  Past Medical History[3]  Past Surgical History[4]    Short Social Hx on File[5]        Family History[6]    REVIEW OF SYSTEMS:  GENERAL: feels well otherwise  SKIN: denies any unusual skin lesions  EYES: denies blurred vision or double vision  HEENT: denies nasal congestion, sinus pain or ST  LUNGS: denies shortness of breath with exertion  CARDIOVASCULAR: denies chest pain on exertion  GI: as above  : denies nocturia or changes in stream  MUSCULOSKELETAL: denies back pain  NEURO: denies headaches  PSYCHE: denies depression or anxiety  HEMATOLOGIC: denies hx of anemia  ENDOCRINE: denies thyroid history  ALL/ASTHMA: denies hx of allergy or asthma    EXAM:  Ht 5' 3\" (1.6 m)   Wt 217 lb (98.4 kg)   BMI 38.44 kg/m²   GENERAL: well developed, well nourished, in no apparent distress  SKIN: no rashes, no suspicious lesions  HEENT: atraumatic, normocephalic, ears and throat are clear  EYES: PERRLA, EOMI, normal optic disk,conjunctiva are clear  NECK: supple, no adenopathy, no bruits  CHEST: no chest tenderness  LUNGS: clear to auscultation  CARDIO: RRR without murmur  GI: good BS's and no masses, HSM or tenderness  RECTAL: Exam not done.  MUSCULOSKELETAL: back is not tender,FROM of the back  EXTREMITIES: no cyanosis, clubbing or edema  NEURO: Oriented times three, cranial nerves are intact, motor and sensory are grossly intact    Assessment:  Personal history of colon polyps        The patient is at increased risk for colon cancer given their personal history of adenomatous polyps.  Adenomatous polyp found initially 2009. Last  colonoscopy was 2018.  Repeat colonoscopy indicated.    Plan:  Colonoscopy will be scheduled within the next month or at the patient's earliest convenience.  The risks of perforation, bleeding, and missed polyps or cancer were reviewed with the patient.     Thanks for the opportunity to participate in this patient's care.  Dandre Ley MD - Gastroenterology           [1]   Allergies  Allergen Reactions    Lisinopril ANAPHYLAXIS     Throat tightness      Penicillins      Hand swelling   [2]   Current Outpatient Medications   Medication Sig Dispense Refill    labetalol 100 MG Oral Tab Take 1 tablet (100 mg total) by mouth 2 (two) times daily.      chlorthalidone 25 MG Oral Tab Take 1 tablet (25 mg total) by mouth every other day.      amLODIPine besylate 10 MG Oral Tab Take 1 tablet (10 mg total) by mouth daily. 90 tablet 1    Irbesartan 150 MG Oral Tab Take 1 tablet (150 mg total) by mouth daily. (Patient taking differently: Take 1 tablet (150 mg total) by mouth in the morning and 1 tablet (150 mg total) before bedtime.) 90 tablet 1    atorvastatin 20 MG Oral Tab Take 1 tablet (20 mg total) by mouth once daily. (Patient taking differently: Take 1 tablet (20 mg total) by mouth nightly.) 90 tablet 3    acetaminophen 500 MG Oral Tab Take 1 tablet (500 mg total) by mouth every 6 (six) hours as needed for Pain.      Ketoconazole 2 % External Shampoo Apply to AA on the scalp, lather onto the scalp and let sit for 2-3 min and then rinse. (Patient taking differently: as needed. Apply to AA on the scalp, lather onto the scalp and let sit for 2-3 min and then rinse.) 120 mL 1    Clobetasol Propionate 0.05 % External Ointment Apply to AA BID with flares (Patient taking differently: as needed. Apply to AA BID with flares) 60 g 2    Fluocinonide 0.05 % External Solution Apply to AA on scalp nightly with flares. Use 2-3 weekly as maintenance (Patient taking differently: as needed. Apply to AA on scalp nightly with flares. Use 2-3  weekly as maintenance) 60 mL 1    aspirin 81 MG Oral Tab Take 1 tablet (81 mg total) by mouth in the morning.     [3]   Past Medical History:   ALLERGIC RHINITIS    Anxiety    Cataract    Chondromalacia patella    Diabetes (HCC)    no medication    Disorder of thyroid    nodules in thyroid    Empty sella syndrome (HCC)    Fibroid    Fibroid    GERD (gastroesophageal reflux disease)    High blood pressure    High cholesterol    HYPERLIPIDEMIA    HYPERTENSION     Lichen planus    LUMBAR DISC DISEASE    Multinodular goiter    Obesity    Osteoarthritis    Plantar fasciitis    Pre-diabetes    Rotator cuff tear - RIGHT    SLEEP APNEA    AHI 64 Sao2 Vasyl 84% CPAP 13cwp    Sleep apnea    no equipment    Tubular Adenoma - colon     Urge incontinence    Visual impairment    glasses   [4]   Past Surgical History:  Procedure Laterality Date          x1    Colonoscopy  2009    3 adenomas    Colonoscopy,diagnostic  2012    [facility] Crowell. hemorrhoids, repeat .    D & c      Hernia surgery      Hernia surgery      umbilical     Special service or report      S/P R KNEE ARTHROSCOPY    Umbilical hernia repair N/A 2015    Procedure: HERNIA UMBILICAL REPAIR ADULT;  Surgeon: Franck Riddle MD;  Location:  MAIN OR    Up gi endoscopy,dilatn w guide N/A 2015    Procedure: ESOPHAGOGASTRODUODENOSCOPY W/ DILATION;  Surgeon: Chi Crowell MD;  Location: [facility]    Upper gi endoscopy - referral  5/19/15 - [facility] Crowell    Normal EGD - dilated with 54 Fr Savary    Upper gi endoscopy,biopsy N/A 2014    [facility] Crowell. 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[facility]/ [employer]   Tobacco Use    Smoking status: Never    Smokeless tobacco: Never   Vaping Use    Vaping status: Never Used   Substance and Sexual Activity    Alcohol use: Not Currently     Alcohol/week: 0.0 standard  drinks of alcohol     Comment: RARE    Drug use: No    Sexual activity: Not Currently   Other Topics Concern     Service No    Blood Transfusions No    Caffeine Concern No    Occupational Exposure No    Hobby Hazards No    Sleep Concern No    Stress Concern No    Weight Concern Yes    Special Diet Yes     Comment: DIABETIC    Exercise Yes     Comment: SPORADIC    Seat Belt Yes   Social History Narrative    /SINGLE    1 CHILD     [facility]        COLON - 10/18 - tubular adenomas x 3 - f/u 3 yr    BMD - 2/13 - NL   [6]   Family History  Problem Relation Age of Onset    Cancer Father         PROSTATE CA    Heart Disorder Father     Hypertension Father     Obesity Sister     Other (Other) Sister         THYROID SURGERY

## 2025-06-29 NOTE — OPERATIVE REPORT
PATIENT NAME: Vidhi Wiggins  MRN: [id number]  DATE OF OPERATION: 7/1/2025  REFERRING PHYSICIAN: Dr. Khan  Medications:  MAC  DATE OF LAST COLONOSCOPY:  2018  PREOPERATIVE DIAGNOSIS                personal history of colon polyps (2018, 2012, 2009)  POSTOPERATIVE DIAGNOSIS:  Diverticulosis were scattered throughout the left colon.   Otherwise normal colon exam.     PROCEDURE PERFORMED:               Colonoscopy   Endoscopist:                                             Dandre Ley MD     PROCEDURE AND FINDINGS: The patient was placed into the left lateral decubitus after informed consent was obtained.  All questions were answered.  An updated history and physical were performed and an ASA score of 2 was assigned.  Informed consent was obtained prior to the procedure, with review of possible complications including bleeding, infection, and missed polyps and or cancer.  MAC was administered.    A digital rectal exam was performed and was normal.  The video pediatric colonoscope was passed from anus to cecum.  The ileocecal valve, appendiceal orfice, hepatic and splenic flexures were all well visualized.  The bowel preparation was rated on the Aronchick bowel prep scale as 1. (1 - excellent > 95% mucosa seen; 2 - good - clear liquid up to 25% of the mucosa, 90% mucosa seen;  3 - fair - semisolid stool not suctioned, but 90% of the mucosa seen; 4 - poor - semisolid stool not suctioned, but < 90% mucosa seen; 5 - inadequate - repeat prep needed) .     Findings included no polyps.  Diverticulosis were scattered throughout the left colon.   On retroflexion of the scope in the anorectum, enlarged internal hemorrhoids were noted.     The scope withdrawal from cecum to anus was 10 minutes.    RECOMMENDATIONS         1. The patient will be provided with a written summary of today's endoscopic findings.        2. Repeat the colonoscopy in 5 years.     Dandre Ley MD, Gastroenterologist  Cc: Dr. Khan

## 2025-07-01 NOTE — ANESTHESIA PREPROCEDURE EVALUATION
PRE-OP EVALUATION    Patient Name: JESSICA Wiggins    Admit Diagnosis: SPECIAL SCREENING FOR MALIGNANT NEOPLASMS COLON HISTORY OF COLON POLYPS    Pre-op Diagnosis: SPECIAL SCREENING FOR MALIGNANT NEOPLASMS COLON HISTORY OF COLON POLYPS    COLONOSCOPY    Anesthesia Procedure: COLONOSCOPY    Surgeons and Role:     * Dandre Ley MD - Primary    Pre-op vitals reviewed.  Temp: 98.3 °F (36.8 °C)  Pulse: 78  Resp: 16  BP: 153/87  SpO2: 93 %  Body mass index is 38.44 kg/m².    Current medications reviewed.  Hospital Medications:  Current Medications[1]    Outpatient Medications:   Prescriptions Prior to Admission[2]    Allergies: Lisinopril and Penicillins      Anesthesia Evaluation    Patient summary reviewed.    Anesthetic Complications  (-) history of anesthetic complications         GI/Hepatic/Renal      (+) GERD                           Cardiovascular                (+) obesity  (+) hypertension   (+) hyperlipidemia                                  Endo/Other      (+) diabetes                            Pulmonary                    (+) sleep apnea and noncompliant      Neuro/Psych      (+) depression                                Past Surgical History[3]  Social Hx on file[4]  History   Drug Use No     Available pre-op labs reviewed.               Airway      Mallampati: III  Mouth opening: >3 FB  TM distance: > 6 cm  Neck ROM: full Cardiovascular    Cardiovascular exam normal.         Dental    Dentition appears grossly intact         Pulmonary    Pulmonary exam normal.                 Other findings              ASA: 2   Plan: MAC  NPO status verified and patient meets guidelines.    Post-procedure pain management plan discussed with surgeon and patient.    Comment:     A detailed discussion about the anesthetic plan was held with JESSICA CARNEY Feliciano in the preoperative area. Benefits and risks of MAC anesthesia were discussed, including intraoperative awareness/recall, PONV, reasonable expectations of post-operative  pain/discomfort, aspiration, conversion to general anesthesia, dental injury, pressure/nerve injuries from positioning, and other serious but rare complications (life-threatening cardiopulmonary events). All questions were answered appropriately and patient demonstrated understanding of realistic expectations and risks of undergoing anesthesia. JESSICA ANEGLIKA Wiggins consents to receiving anesthesia and wishes to proceed.      Plan/risks discussed with: patient                Present on Admission:  **None**             [1]    lactated ringers infusion   Intravenous Continuous   [2]   Medications Prior to Admission   Medication Sig Dispense Refill Last Dose/Taking    labetalol 100 MG Oral Tab Take 1 tablet (100 mg total) by mouth 2 (two) times daily.   7/1/2025 Morning    chlorthalidone 25 MG Oral Tab Take 1 tablet (25 mg total) by mouth every other day.   6/30/2025    amLODIPine besylate 10 MG Oral Tab Take 1 tablet (10 mg total) by mouth daily. 90 tablet 1 7/1/2025 Morning    Irbesartan 150 MG Oral Tab Take 1 tablet (150 mg total) by mouth daily. (Patient taking differently: Take 1 tablet (150 mg total) by mouth in the morning and 1 tablet (150 mg total) before bedtime.) 90 tablet 1 6/30/2025    atorvastatin 20 MG Oral Tab Take 1 tablet (20 mg total) by mouth once daily. (Patient taking differently: Take 1 tablet (20 mg total) by mouth nightly.) 90 tablet 3 6/30/2025    acetaminophen 500 MG Oral Tab Take 1 tablet (500 mg total) by mouth every 6 (six) hours as needed for Pain.   Past Week    Ketoconazole 2 % External Shampoo Apply to AA on the scalp, lather onto the scalp and let sit for 2-3 min and then rinse. (Patient taking differently: as needed. Apply to AA on the scalp, lather onto the scalp and let sit for 2-3 min and then rinse.) 120 mL 1 Past Month    Clobetasol Propionate 0.05 % External Ointment Apply to AA BID with flares (Patient taking differently: as needed. Apply to AA BID with flares) 60 g 2 Past Month     Fluocinonide 0.05 % External Solution Apply to AA on scalp nightly with flares. Use 2-3 weekly as maintenance (Patient taking differently: as needed. Apply to AA on scalp nightly with flares. Use 2-3 weekly as maintenance) 60 mL 1 Past Month    aspirin 81 MG Oral Tab Take 1 tablet (81 mg total) by mouth in the morning.   2025   [3]   Past Surgical History:  Procedure Laterality Date          x1    Colonoscopy  2009    3 adenomas    Colonoscopy,diagnostic  2012    [facility] Crowell. hemorrhoids, repeat .    D & c      Hernia surgery      Hernia surgery      umbilical     Special service or report      S/P R KNEE ARTHROSCOPY    Umbilical hernia repair N/A 2015    Procedure: HERNIA UMBILICAL REPAIR ADULT;  Surgeon: Franck Riddle MD;  Location:  MAIN OR    Up gi endoscopy,dilatn w guide N/A 2015    Procedure: ESOPHAGOGASTRODUODENOSCOPY W/ DILATION;  Surgeon: Chi Crowell MD;  [facility]    Upper gi endoscopy - referral  5/19/15 - MATA Crowell    Normal EGD - dilated with 54 Fr Savary    Upper gi endoscopy,biopsy N/A 2014    MATA Crowell. 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

## 2025-07-01 NOTE — ANESTHESIA POSTPROCEDURE EVALUATION
[facility]    JESSICA Wiggins Patient Status:  Hospital Outpatient Surgery   Age/Gender 69 year old female MRN [id number]   Location [facility] Attending Dandre Ley MD   Hosp Day # 0 PCP Talita Khan MD       Anesthesia Post-op Note    COLONOSCOPY    Procedure Summary       Date: 07/01/25 Room / Location:  ENDOSCOPY 03 / [facility]    Anesthesia Start: 1107 Anesthesia Stop: 1139    Procedure: COLONOSCOPY Diagnosis: (diverticulosis,large hemorrhoids)    Surgeons: Dandre Ley MD Anesthesiologist: Patience Gould MD    Anesthesia Type: MAC ASA Status: 2            Anesthesia Type: MAC    Vitals Value Taken Time   /58 07/01/25 11:38   Temp 97 07/01/25 11:39   Pulse 72 07/01/25 11:39   Resp 14 07/01/25 11:39   SpO2 96 % 07/01/25 11:39   Vitals shown include unfiled device data.        Patient Location: Endoscopy    Anesthesia Type: MAC    Airway Patency: patent    Postop Pain Control: adequate    Mental Status: mildly sedated but able to meaningfully participate in the post-anesthesia evaluation    Nausea/Vomiting: none    Cardiopulmonary/Hydration status: stable euvolemic    Complications: no apparent anesthesia related complications    Postop vital signs: stable    Dental Exam: Unchanged from Preop    Patient to be discharged from PACU when criteria met.

## 2025-07-01 NOTE — BRIEF OP NOTE
Pre-Operative Diagnosis: SPECIAL SCREENING FOR MALIGNANT NEOPLASMS COLON HISTORY OF COLON POLYPS     Post-Operative Diagnosis: diverticulosis,large hemorrhoids      Procedure Performed:   COLONOSCOPY    Surgeons and Role:     * Dandre Ley MD - Primary    Assistant(s):        Surgical Findings: see above     Specimen: none     Estimated Blood Loss: No data recorded    Dictation Number:  none    Dandre Ley MD  7/1/2025  11:32 AM

## 2025-07-01 NOTE — DISCHARGE INSTRUCTIONS
ENDOSCOPY DISCHARGE INSTRUCTIONS    Procedure Performed:   Colonoscopy  Endoscopist: No name on file  FINDINGS:   Internal/external hemorrhoids and Diverticulosis (pockets in colon that develop with age and lack of fiber intake)    MEDICATIONS:  You may resume all other medications today    DIET:  General    BIOPSIES:  No biopsies were taken      ADDITIONAL RECOMMENDATIONS:  Repeat the colonoscopy in 5 years.     Activity for remainder of today:    REST TODAY  DO NOT drive or operate heavy machinery  DO NOT drink any alcoholic beverages  DO NOT sign any legal documents or make any important decisions    After your procedure(s):  It is not unusual to feel bloated or gassy .  Passing gas and belching is encouraged. Lying on your left side with your knees flexed may relieve the discomfort. A hot pack to the abdomen may also help.    After your gastroscopy (upper endoscopy): You may experience a slight sore throat which will subside. Throat lozenges or salt water gargle can be used.    FOLLOW-UP:  Contact the office at [phone] for follow-up appointment if needed or if you develop any of the following:    Severe abdominal pain/discomfort       Excessive bleeding or black tarry stool  Difficulty breathing or swallowing        Persistent nausea,vomiting, or a fever above 100 degrees or chills

## (undated) DEVICE — SUTURE VICRYL 0 UR-6

## (undated) DEVICE — LIGASURE LAP MARYLAND 37CM

## (undated) DEVICE — 450 ML BOTTLE OF 0.05% CHLORHEXIDINE GLUCONATE IN 99.95% STERILE WATER FOR IRRIGATION, USP AND APPLICATOR.: Brand: IRRISEPT ANTIMICROBIAL WOUND LAVAGE

## (undated) DEVICE — KENDALL SCD EXPRESS SLEEVES, KNEE LENGTH, MEDIUM: Brand: KENDALL SCD

## (undated) DEVICE — CHLORAPREP 26ML APPLICATOR

## (undated) DEVICE — TROCAR: Brand: KII FIOS FIRST ENTRY

## (undated) DEVICE — WOUND RETRACTOR AND PROTECTOR: Brand: ALEXIS O WOUND PROTECTOR-RETRACTOR

## (undated) DEVICE — VIOLET BRAIDED (POLYGLACTIN 910), SYNTHETIC ABSORBABLE SUTURE: Brand: COATED VICRYL

## (undated) DEVICE — TROCAR: Brand: KII® SLEEVE

## (undated) DEVICE — SUTURE STRATAFIX 60CML 36MML

## (undated) DEVICE — PROXIMATE RELOADABLE LINEAR CUTTER WITH SAFETY LOCK-OUT, 75MM: Brand: PROXIMATE

## (undated) DEVICE — PROXIMATE LINEAR CUTTER RELOAD, BLUE, 75MM: Brand: PROXIMATE

## (undated) DEVICE — TROCARS: Brand: KII® BLUNT TIP ACCESS SYSTEM

## (undated) DEVICE — #15 STERILE STAINLESS BLADE: Brand: STERILE STAINLESS BLADES

## (undated) DEVICE — SOL  .9 1000ML BTL

## (undated) DEVICE — UNDYED BRAIDED (POLYGLACTIN 910), SYNTHETIC ABSORBABLE SUTURE: Brand: COATED VICRYL

## (undated) DEVICE — GENERAL LAPAROS CDS-LF: Brand: MEDLINE INDUSTRIES, INC.

## (undated) DEVICE — STERILE SYNTHETIC POLYISOPRENE POWDER-FREE SURGICAL GLOVES WITH HYDROGEL COATING, SMOOTH FINISH, STRAIGHT FINGER: Brand: PROTEXIS

## (undated) NOTE — ED AVS SNAPSHOT
[facility] Emergency Department    [place]  [address] [address]    Phone:  [phone]    Fax:  [phone]           Lindsey Monsivais   MRN: [id number]    Department:  [facility] Emergency Department   Date of Visit:  4/1/2017 IF THERE IS ANY CHANGE OR WORSENING OF YOUR CONDITION, CALL YOUR PRIMARY CARE PHYSICIAN AT ONCE OR RETURN IMMEDIATELY TO THE EMERGENCY DEPARTMENT.     If you have been prescribed any medication(s), please fill your prescription right away and begin taking t

## (undated) NOTE — ED AVS SNAPSHOT
[facility] Emergency Department    [facility]  [address] [address]    Phone:  [phone]    Fax:  [phone]           Joey Felton   MRN: [id number]    Department:  [facility] Emergency Department   Date of Visit:  4/1/2017 Medication List      START taking these medications     HYDROcodone-acetaminophen 5-325 MG Tabs   Quantity:  20 tablet   Commonly known as:  NORCO   Take 1-2 tablets by mouth every 4 (four) hours as needed for Pain.             Where to Get Your Medic return to your personal doctor) about any new or lasting problems. The primary care or specialist physician will see patients referred from the [facility] Emergency Department. Follow-up care is at the discretion of that Physician.     IF THERE IS ANY - If you have concerns related to behavioral health issues or thoughts of harming yourself, contact [address] [place] [address] at [phone].     - If you don’t have insurance, Shari Wyman has partnered with [employer] Nakia complains of intermittent left groin pain radiating mid leg for one week, no injury. FINDINGS:  No fracture or dislocation. Marked osteoarthritic changes in the left knee are noted.       Dictated by: Kennedy Miller MD on 4/01/2017 at 14:07       A